# Patient Record
Sex: FEMALE | Race: WHITE | Employment: UNEMPLOYED | RURAL
[De-identification: names, ages, dates, MRNs, and addresses within clinical notes are randomized per-mention and may not be internally consistent; named-entity substitution may affect disease eponyms.]

---

## 2018-11-19 ENCOUNTER — OFFICE VISIT (OUTPATIENT)
Dept: FAMILY MEDICINE CLINIC | Age: 23
End: 2018-11-19

## 2018-11-19 VITALS
WEIGHT: 225 LBS | SYSTOLIC BLOOD PRESSURE: 116 MMHG | BODY MASS INDEX: 37.49 KG/M2 | HEART RATE: 73 BPM | OXYGEN SATURATION: 98 % | RESPIRATION RATE: 16 BRPM | HEIGHT: 65 IN | TEMPERATURE: 97.5 F | DIASTOLIC BLOOD PRESSURE: 81 MMHG

## 2018-11-19 DIAGNOSIS — H66.001 ACUTE SUPPURATIVE OTITIS MEDIA OF RIGHT EAR WITHOUT SPONTANEOUS RUPTURE OF TYMPANIC MEMBRANE, RECURRENCE NOT SPECIFIED: ICD-10-CM

## 2018-11-19 DIAGNOSIS — G51.0 BELL'S PALSY: Primary | ICD-10-CM

## 2018-11-19 PROBLEM — E66.01 SEVERE OBESITY (HCC): Status: ACTIVE | Noted: 2018-11-19

## 2018-11-19 RX ORDER — LEVOFLOXACIN 500 MG/1
500 TABLET, FILM COATED ORAL DAILY
Qty: 10 TAB | Refills: 0 | Status: SHIPPED | OUTPATIENT
Start: 2018-11-19 | End: 2018-11-29

## 2018-11-19 NOTE — PROGRESS NOTES
CC: New patient, Bell's palsy HPI: Pt is a 20 y.o. female who presents for new patient, Bell's palsy. Three weeks ago she noticed some tingling of her R face and then that she couldn't purse her lips around her straw at dinner. She had been fine leading up to that. Symptoms continued to worsen that night and she went to urgent care the next morning. There she was given acyclovir, prednisone and a Z-pack for double ear infection. She had recently been sick with a viral illness as well. Since then her symptoms have not improved at all but do not seem to have worsened. Her right eye does not close all the way and she is not able to chew on the R side. She has been using artificial tears in her eye and covering it with an eye patch at night. She has not noticed any changes in vision other than at the end of the day she seems to have more secretions and vision gets blurry. She does continue to have pain in her ears. She denies any personal or family h/o neurological problems and has generally been very healthy. History reviewed. No pertinent past medical history. Family History Problem Relation Age of Onset  Diabetes Mother  Diabetes Maternal Grandmother  Heart Disease Maternal Grandfather  Diabetes Maternal Grandfather Social History Tobacco Use  Smoking status: Never Smoker  Smokeless tobacco: Never Used Substance Use Topics  Alcohol use: No  
  Frequency: Never  Drug use: Not on file ROS: 
Positive only when bolded Constitutional: HA, F/C Eyes: Itching/draining, changes in vision Ears, nose, mouth, throat, and face: Rhinorrhea, congestion, sore throat, ear pain Respiratory: SOB, wheezing, cough Cardiovascular: CP, palpitations Hematologic/lymphatic: TOSHIA Neurological: Changes in gait, numbness/tingling PE: 
Visit Vitals /81 (BP 1 Location: Right arm, BP Patient Position: Sitting) Pulse 73 Temp 97.5 °F (36.4 °C) (Oral) Resp 16  
 Ht 5' 4.5\" (1.638 m) Wt 225 lb (102.1 kg) LMP 10/27/2018 (Exact Date) SpO2 98% BMI 38.02 kg/m² Gen: Pt sitting in chair, in NAD Head: Normocephalic, atraumatic. +facial droop on R and lack of forehead wrinkling on R Eyes: Sclera anicteric but mildly injected b/l, EOM grossly intact, PERRL. Unable to fully close R eye. Ears: L canal erythematous with sheet of dry wax partially obscuring TM, but appears pearly grey with good reflex. R canal erythematous +effusion. No vesicles. Nose: Normal nasal mucosa Throat: MMM, normal lips, tongue, teeth and gums Neck: Supple, no LAD 
CVS: Normal S1, S2, no m/r/g Resp: CTAB, no wheezes or rales Extrem: Atraumatic, no cyanosis or edema Pulses: 2+ Skin: Warm, dry Neuro: Alert, oriented, appropriate. Marked R facial droop. Decreased sensation to light touch over entire R half of face. A/P: Pt is a 20 y.o. female who presents for new patient, Bell's Palsy of assumed viral etiology, however she did have a b/l otitis media on presentation and continues to have evidence of R otitis media today despite treatment with azithromycin (pt is PCN allergic/anaphylaxis) so inflammation of facial nerve from this is also possible. No vesicular lesions to suggest Anjel Hunt syndrome. Rapid onset and forehead involvement are reassuring, but given lack of any improvement in symptoms and significant interference in her daily activities, will refer to Neuro for further work-up. - Will treat otitis media with levaquin given lack of response to azithromycin and pt with anaphylactic reaction to PCN, do not want to attempt cephalosporin - If ear pain symptoms not improved, will need to RTC and likely referral to ENT 
- RTC prn pending results of above interventions Discussed diagnoses in detail with patient. Medication risks/benefits/side effects discussed with patient. All of the patient's questions were addressed.  The patient understands and agrees with our plan of care. The patient knows to call back if they are unsure of or forget any changes we discussed today or if the symptoms change. The patient received an After-Visit Summary which contains VS, orders, medication list and allergy list. This can be used as a \"mini-medical record\" should they have to seek medical care while out of town. No current outpatient medications on file prior to visit. No current facility-administered medications on file prior to visit.

## 2018-11-19 NOTE — PROGRESS NOTES
Reviewed record in preparation for visit and have obtained necessary documentation. Patient did not bring medications to visit for review. Information provided on Advanced Directive, Living Will. Body mass index is 38.02 kg/m². Health Maintenance Due Topic Date Due  
 HPV Age 9Y-34Y (1 - Female 3-dose series) 05/11/2004  DTaP/Tdap/Td series (1 - Tdap) 05/11/2014  PAP AKA CERVICAL CYTOLOGY  05/11/2014  Influenza Age 5 to Adult  08/01/2018

## 2018-12-21 ENCOUNTER — OFFICE VISIT (OUTPATIENT)
Dept: NEUROLOGY | Age: 23
End: 2018-12-21

## 2018-12-21 VITALS
SYSTOLIC BLOOD PRESSURE: 118 MMHG | HEIGHT: 65 IN | DIASTOLIC BLOOD PRESSURE: 68 MMHG | RESPIRATION RATE: 20 BRPM | BODY MASS INDEX: 37.32 KG/M2 | WEIGHT: 224 LBS

## 2018-12-21 DIAGNOSIS — G51.0 RIGHT-SIDED BELL'S PALSY: ICD-10-CM

## 2018-12-21 DIAGNOSIS — H93.A9 PULSATILE TINNITUS: ICD-10-CM

## 2018-12-21 DIAGNOSIS — G51.0 RIGHT-SIDED BELL'S PALSY: Primary | ICD-10-CM

## 2018-12-21 NOTE — PROGRESS NOTES
[id number]   [place] [address]   [facility]    [facility]   [phone] Fax             Referring: Nicole Perdue MD      Chief Complaint   Patient presents with    Facial Pain     61-year-old right-handed young woman who comes today for evaluation of what she calls Bell's palsy not getting better. She says about 25 October, the Wednesday before Halloween, she had the onset of right sided facial droop. She and her  were about to go out to dinner and then she realized that her face was drooping. She was unable to close the eye. She is unable to keep beverages in her mouth. She was seen and evaluated by primary care and she says that she had a cold that seemed to come on around the same time she then went into an upper respiratory type infection and had double ear infection she was told. She has been given 2 rounds of antibiotics. She has had absolutely no improvement at all since the onset of this. No other inciting factor. No injury. She has never had this before. She is never had any focal neurologic deficit that persisted and then spontaneously resolved. No family history of such. No exposures. She says that she is unable to close her right eye. She is unable to use a straw. Water will come out of her mouth when she drinks. She is keeping the eye taped down at night to prevent injury. She is also using artificial tears to ensure that the eye stays moist.  She has not had any weakness in the arms or legs. No numbness or tingling in the legs. Says the right side of the face feels a bit numb but she can feel it if she touches it. She has not had a recent fever chills nausea or vomiting. No chest pain or palpitations. No loss or alteration in consciousness. History reviewed. No pertinent past medical history.     Past Surgical History:   Procedure Laterality Date    HX HEENT         Current Outpatient Medications   Medication Sig Dispense Refill    ALBUTEROL IN Take  by inhalation. Allergies   Allergen Reactions    Amoxicillin Anaphylaxis    Pcn [Penicillins] Anaphylaxis    Sulfur Anaphylaxis       Social History     Tobacco Use    Smoking status: Never Smoker    Smokeless tobacco: Never Used   Substance Use Topics    Alcohol use: No     Frequency: Never    Drug use: Not on file       Family History   Problem Relation Age of Onset    Diabetes Mother     Diabetes Maternal Grandmother     Heart Disease Maternal Grandfather     Diabetes Maternal Grandfather        Review of Systems  Pertinent positives and negatives as noted with remainder of comprehensive review negative    Examination  Visit Vitals  /68   Resp 20   Ht 5' 4.5\" (1.638 m)   Wt 101.6 kg (224 lb)   BMI 37.86 kg/m²     She is a friendly lady. Her dress and grooming are appropriate. She has no scleral icterus. She has no carotid bruit. Heart is regular. Pulses symmetrical.  No edema of the lower extremities. Neurologically she is awake alert oriented and conversant. She has normal speech-language cognition and attention. She has a bit of a sad affect and she gets tearful describing how her facial droop is not gotten better. Cranial nerves are intact 2-12 with the exception of a right peripheral seventh and she is unable to raise the brow. She is unable to close the eye completely. Bell's phenomena is seen. She does also endorse some decreased sensation about the right side of the face versus the left. No pronation drift or abnormal movement. No abnormality of bulk or tone. Strength is full in the upper and lower extremities in all muscle groups to direct testing. Reflexes symmetrical throughout no pathologic reflexes. No ataxia. Sensory intact primary.   Her gait is steady    Impression/Plan  78-year-old woman with right sided Bell's palsy ongoing now for 2 months without any improvement at all which certainly is a bit ominous and worrisome for an intracranial anatomic abnormality or other etiology of such. Given that she is not had any improvement at all over the last 2 months we will proceed with an MRI of the brain evaluating the right facial nerve but then also looking for demyelinating plaques, evidence of potential neurosarcoid versus other. Given her pulsatile tinnitus one also needs to worry about AV etc.  We will start with a carotid Doppler but depending on the results of the MRI we may wish to get a CTA looking for a vascular abnormality that could be etiology of the Bell's as well as of the pulsatile tinnitus. No medications given today. Multiple lab tests today including sedimentation rate, MUSA, ACE level, B12 and thyroid function sent. Follow-up after her testing. Dora Ramirez MD    This note was created using voice recognition software. Despite editing, there may be syntax errors. This note will not be viewable in [address]

## 2018-12-27 NOTE — PROCEDURES
Carotid Doppler:     Date:  12/21/2018    Requesting Physician:  Lucía Villalpando MD     Indication:  Pulsatile tinnitus. B-mode imaging reveals no significant plaque throughout the carotid systems bilaterally. Doppler spectral analysis reveals no significant velocity shifts. Vertebral artery flow is antegrade bilaterally. Interpretation:  Normal study.

## 2019-01-02 LAB
ACE SERPL-CCNC: 44 U/L (ref 14–82)
ANA SER QL: POSITIVE
CENTROMERE B AB SER-ACNC: <0.2 AI (ref 0–0.9)
CHROMATIN AB SERPL-ACNC: 0.2 AI (ref 0–0.9)
DSDNA AB SER-ACNC: 143 IU/ML (ref 0–9)
ENA JO1 AB SER-ACNC: <0.2 AI (ref 0–0.9)
ENA RNP AB SER-ACNC: <0.2 AI (ref 0–0.9)
ENA SCL70 AB SER-ACNC: <0.2 AI (ref 0–0.9)
ENA SM AB SER-ACNC: <0.2 AI (ref 0–0.9)
ENA SS-A AB SER-ACNC: <0.2 AI (ref 0–0.9)
ENA SS-B AB SER-ACNC: <0.2 AI (ref 0–0.9)
ERYTHROCYTE [SEDIMENTATION RATE] IN BLOOD BY WESTERGREN METHOD: 12 MM/HR (ref 0–32)
RPR SER QL: NON REACTIVE
SEE BELOW, 164869: ABNORMAL
T4 FREE SERPL-MCNC: 1.19 NG/DL (ref 0.82–1.77)
TSH SERPL DL<=0.005 MIU/L-ACNC: 2.06 UIU/ML (ref 0.45–4.5)
VIT B12 SERPL-MCNC: 414 PG/ML (ref 232–1245)

## 2019-01-03 ENCOUNTER — HOSPITAL ENCOUNTER (OUTPATIENT)
Dept: MRI IMAGING | Age: 24
Discharge: HOME | End: 2019-01-03
Attending: PSYCHIATRY & NEUROLOGY
Payer: COMMERCIAL

## 2019-01-03 DIAGNOSIS — G51.0 RIGHT-SIDED BELL'S PALSY: ICD-10-CM

## 2019-01-03 DIAGNOSIS — H93.A9 PULSATILE TINNITUS: ICD-10-CM

## 2019-01-03 PROCEDURE — 70553 MRI BRAIN STEM W/O & W/DYE: CPT

## 2019-01-03 PROCEDURE — 74011250636 HC RX REV CODE- 250/636: Performed by: PSYCHIATRY & NEUROLOGY

## 2019-01-03 PROCEDURE — A9575 INJ GADOTERATE MEGLUMI 0.1ML: HCPCS | Performed by: PSYCHIATRY & NEUROLOGY

## 2019-01-03 RX ORDER — GADOTERATE MEGLUMINE 376.9 MG/ML
20 INJECTION INTRAVENOUS
Status: COMPLETED | OUTPATIENT
Start: 2019-01-03 | End: 2019-01-03

## 2019-01-03 RX ADMIN — GADOTERATE MEGLUMINE 20 ML: 376.9 INJECTION INTRAVENOUS at 14:34

## 2019-01-22 ENCOUNTER — OFFICE VISIT (OUTPATIENT)
Dept: NEUROLOGY | Age: 24
End: 2019-01-22

## 2019-01-22 VITALS
OXYGEN SATURATION: 98 % | DIASTOLIC BLOOD PRESSURE: 60 MMHG | BODY MASS INDEX: 37.32 KG/M2 | WEIGHT: 224 LBS | HEIGHT: 65 IN | RESPIRATION RATE: 16 BRPM | HEART RATE: 77 BPM | SYSTOLIC BLOOD PRESSURE: 105 MMHG

## 2019-01-22 DIAGNOSIS — G51.0 BELL'S PALSY: Primary | ICD-10-CM

## 2019-01-22 DIAGNOSIS — R76.8 ELEVATED ANTINUCLEAR ANTIBODY (ANA) LEVEL: ICD-10-CM

## 2019-01-22 RX ORDER — PREDNISONE 10 MG/1
TABLET ORAL
Qty: 42 TAB | Refills: 0 | Status: SHIPPED | OUTPATIENT
Start: 2019-01-22 | End: 2019-03-19 | Stop reason: ALTCHOICE

## 2019-01-22 RX ORDER — GABAPENTIN 300 MG/1
300 CAPSULE ORAL 3 TIMES DAILY
Qty: 90 CAP | Refills: 5 | Status: SHIPPED | OUTPATIENT
Start: 2019-01-22 | End: 2019-03-19

## 2019-01-22 NOTE — PROGRESS NOTES
Ottoniel Boone is a 20 y.o. female who presents with the following  Chief Complaint   Patient presents with    Follow-up     MRI, [facility], Lab       HPI Patient comes in for a follow up for MRI brain, doppler, labs. She states the symptoms are the same. Still having daily facial pain, right sided numbness, tingling and droop. He has not noticed really any positive changes. She has not seen any improvement since things started in October. Has not tried steroids, gabapentin. No other concerns. Allergies   Allergen Reactions    Amoxicillin Anaphylaxis    Pcn [Penicillins] Anaphylaxis    Sulfur Anaphylaxis       Current Outpatient Medications   Medication Sig    predniSONE (DELTASONE) 10 mg tablet 6 po x2 days, 5 po x 2days, 4 po x 2days, 3 po x2days, 2 po x2days, 1 po x 2days    gabapentin (NEURONTIN) 300 mg capsule Take 1 Cap by mouth three (3) times daily.  ALBUTEROL IN Take  by inhalation. No current facility-administered medications for this visit. Social History     Tobacco Use   Smoking Status Never Smoker   Smokeless Tobacco Never Used       History reviewed. No pertinent past medical history. Past Surgical History:   Procedure Laterality Date    HX HEENT         Family History   Problem Relation Age of Onset    Diabetes Mother     Diabetes Maternal Grandmother     Heart Disease Maternal Grandfather     Diabetes Maternal Grandfather        Social History     Socioeconomic History    Marital status:      Spouse name: Not on file    Number of children: Not on file    Years of education: Not on file    Highest education level: Not on file   Tobacco Use    Smoking status: Never Smoker    Smokeless tobacco: Never Used   Substance and Sexual Activity    Alcohol use: No     Frequency: Never       Review of Systems   Eyes: Negative for blurred vision and double vision. Gastrointestinal: Negative for nausea and vomiting.    Neurological: Positive for tingling and sensory change. Negative for dizziness, weakness and headaches. Remainder of comprehensive review is negative. Physical Exam :    Visit Vitals  /60 (BP 1 Location: Right arm, BP Patient Position: Sitting)   Pulse 77   Resp 16   Ht 5' 4.5\" (1.638 m)   Wt 101.6 kg (224 lb)   SpO2 98%   BMI 37.86 kg/m²             Results for orders placed or performed in visit on 18   MUSA, DIRECT, W/REFLEX   Result Value Ref Range    Antinuclear Antibodies Direct Positive (A) Negative    Anti-DNA (DS) Ab,  (H) 0 - 9 IU/mL    RNP Abs <0.2 0.0 - 0.9 AI    Aiken Abs <0.2 0.0 - 0.9 AI    Scleroderma-70 Ab <0.2 0.0 - 0.9 AI    Sjogren's Anti-SS-A <0.2 0.0 - 0.9 AI    Sjogren's Anti-SS-B <0.2 0.0 - 0.9 AI    Antichromatin Ab 0.2 0.0 - 0.9 AI    Anti-Ashlee-1 <0.2 0.0 - 0.9 AI    Centromere B Ab <0.2 0.0 - 0.9 AI    See below Comment    RPR   Result Value Ref Range    RPR Non Reactive Non Reactive   ANGIOTENSIN CONVERTING ENZYME   Result Value Ref Range    Angiotensin Converting Enzyme (ACE) 44 14 - 82 U/L   TSH 3RD GENERATION   Result Value Ref Range    TSH 2.060 0.450 - 4.500 uIU/mL   T4, FREE   Result Value Ref Range    T4, Free 1.19 0.82 - 1.77 ng/dL   VITAMIN B12   Result Value Ref Range    Vitamin B12 414 232 - 1,245 pg/mL   SED RATE (ESR)   Result Value Ref Range    Sed rate (ESR) 12 0 - 32 mm/hr       Orders Placed This Encounter    REFERRAL TO RHEUMATOLOGY     Referral Priority:   Routine     Referral Type:   Consultation     Referral Reason:   Specialty Services Required     Referred to Provider:   Joel Bang MD     Number of Visits Requested:   1    predniSONE (DELTASONE) 10 mg tablet     Si po x2 days, 5 po x 2days, 4 po x 2days, 3 po x2days, 2 po x2days, 1 po x 2days     Dispense:  42 Tab     Refill:  0    gabapentin (NEURONTIN) 300 mg capsule     Sig: Take 1 Cap by mouth three (3) times daily. Dispense:  90 Cap     Refill:  5       1. Bell's palsy    2.  Elevated antinuclear antibody (MUSA) level Follow-up Disposition:  Return in about 2 months (around 3/22/2019), or [place]. MRI as discussed further. Doppler and labs. She has a positive MUSA 143. She has no known family history of any rheumatological issues. She will get a referral to Rheumatology to evaluate this further. We discussed treatment of her symptoms. Try Gabapentin 300 mg TID and Prednisone taper to help with pain, facial symptoms. We discussed PT. She will look online for facial exercises as she has trouble getting places due to her two children as she is primary caregiver with no real other family around. We discussed following back in [place] as she lives there. Call with any concerns.          This note will not be viewable in [employer]

## 2019-02-01 ENCOUNTER — TELEPHONE (OUTPATIENT)
Dept: FAMILY MEDICINE CLINIC | Age: 24
End: 2019-02-01

## 2019-02-01 NOTE — TELEPHONE ENCOUNTER
Spoke with patient and advised her that I will try to get her an earlier appointment and will call her back.  (Neuro referred her to rheumatology)

## 2019-02-01 NOTE — TELEPHONE ENCOUNTER
Please call Pt. She has an appointment with the RA Specialist, Carolin Mobley, the appointment is 04/17/19. Pt is moving back to [place] in June. She needs an earlier appointment with them so she can get seen and whatever they need done before she leaves so she would not have to start here and then find another doctor in [place]. Please call Pt [phone].

## 2019-02-06 NOTE — TELEPHONE ENCOUNTER
Called patient and informed her that an earlier appointment was made for her. Appointment is 3/19/19 at 9:30.

## 2019-03-19 ENCOUNTER — OFFICE VISIT (OUTPATIENT)
Dept: RHEUMATOLOGY | Age: 24
End: 2019-03-19

## 2019-03-19 VITALS
SYSTOLIC BLOOD PRESSURE: 104 MMHG | DIASTOLIC BLOOD PRESSURE: 74 MMHG | HEART RATE: 80 BPM | BODY MASS INDEX: 35.65 KG/M2 | TEMPERATURE: 98.6 F | WEIGHT: 214 LBS | HEIGHT: 65 IN | OXYGEN SATURATION: 97 % | RESPIRATION RATE: 18 BRPM

## 2019-03-19 DIAGNOSIS — G51.0 BELL'S PALSY: Primary | ICD-10-CM

## 2019-03-19 DIAGNOSIS — R76.8 POSITIVE ANA (ANTINUCLEAR ANTIBODY): ICD-10-CM

## 2019-03-19 NOTE — PROGRESS NOTES
Chief Complaint   Patient presents with    Other     Positive MUSA     1. Have you been to the ER, urgent care clinic since your last visit? Hospitalized since your last visit? No    2. Have you seen or consulted any other health care providers outside of the [address] [place] [address] since your last visit? Include any pap smears or colon screening.  No

## 2019-03-19 NOTE — PROGRESS NOTES
REASON FOR VISIT    This is the initial evaluation for Ms. Chencho Pierre a 20 y.o.  female for question of positive MUSA. The patient is referred to the [facility] at the request of Dr. Corrina Gorman. HISTORY OF PRESENT ILLNESS     This is a 20 y.o. female with hx of bells palsy of unclear etiology. MHAQ: 0.5  Pain scale: 0  The patient notes that she developed [place] palsy the Wednesday before Halloween. She went to neurologist who did a CT head and it was normal but in the process of doing that and as part of the blood work, MUSA was positive. The patient is seeing the neurologist still. The patient notes that she had went to go see her  that Wednesday and she was in the middle of eating and all of a sudden her face stopped working. She was dripping food. She was coherent so she went about her day and went to urgent care the next morning. She was told it was bells palsy and went to see her PCP 2-5 days after the bells palsy started. The PCP gave her a prescription for a steroid (possibly decadron) and it didn't work at all. She was referred to neurologist and saw him in early 12/2018 who gave her prednisone which worked immediately. It was a 10 day taper. The symptoms were slowly resolving but they didn't go away completely. She still can't move the right eyebrow, can't flare her right nostril. She can't hold her face up for a long time on the right side. She can close her right eye now. She has mild numbness on right side and she has ringing in the ears as well. She has been doing the facial exercises herself and she thinks that's helped slightly. She was taking gabapentin but she stopped it due to depression and moodiness. She has had mild folliculitis all her life and when she is outside they get more prominent. Before she got the bells palsy, she had test positive for strep throat (6/2018) and she finished the antibiotic course for it.   She does not spend too much time outside. She does play outside with her kids. No tick bites that she can remember. No rashes last summer. No hearing loss but she has ringing in her ears. She has a cough currently due to an infection. No lightheadedness, headaches, vision changes. No dry eyes or mouth. She gets nauseous often. She tends to get UTIs very frequently. No joint pain or swelling. She has knee stiffness b/l in the morning. No pregnancy losses, no hx of blood clots. NO hx of  STIs. No rashes when the bells palsy happened. REVIEW OF SYSTEMS    A 15 point review of systems was performed and summarized below. The questionnaire was reviewed with the patient and scanned into the patient's medical record.     General:  denies recent weight gain, recent weight loss, fatigue, weakness, fever, night sweats  Musculoskeletal:  denies joint pain, joint swelling, morning stiffness , muscle pain  Ears: endorses ringing in ears denies , loss of hearing, deafness  Eyes: denies pain, redness, loss of vision, double vision, blurred vision, dryness, foreign body sensation  Mouth: denies sore tongue, oral ulcers, bleeding gums, loss of taste, dryness, increased dental caries  Nose:  denies nosebleeds, loss of smell, nasal ulcers  Throat:denies frequent sore throats, hoarseness, difficulty in swallowing, pain in jaw while chewing  Neck:  denies swollen glands, tender glands  Cardiopulmonary:  denies pain in chest, irregular heart beat, sudden changes in heart beat, shortness of breath, difficulty breathing at night, dry cough, productive cough, coughing of blood, wheezing  Gastrointestinal:  denies nausea, heartburn, stomach pain relieved by food, vomiting of blood/\"coffee grounds\", jaundice, increasing constipation, persistent diarrhea, blood in stools, black stools  Genitourinary:  denies nocturia, difficult urination, pain or burning on urination, blood in urine, cloudy urine, pus in urine, genital discharge, frequent urination, vaginal dryness, rash/ulcers, sexual difficulties  Hematologic:  denies anemia, bleeding tendency, blood clots  Skin:  denies easy bruising, sun sensitive, rash, redness, hives, skin tightness, nodules/bumps, hair loss, color changes of hands or feet in the cold (Raynaud's), nailbed changes, mechanics hands  Neurologic:  denies headaches, dizziness, muscle weakness, numbness or tingling in hands/feet, memory loss  Psychiatric:  denies depression, excessive worries, PTSD, Bipolar  Sleep: endorses difficulty falling asleep, difficulty staying asleep  denies poor sleep (8 hours), denies snoring, apnea, daytime somnolence,   PAST MEDICAL HISTORY    History reviewed. No pertinent past medical history. Past Surgical History:   Procedure Laterality Date    HX HEENT         FAMILY HISTORY    Family History   Problem Relation Age of Onset    Diabetes Mother     Glaucoma Mother     Diabetes Maternal Grandmother     Heart Disease Maternal Grandfather     Diabetes Maternal Grandfather     Diabetes Maternal Aunt     Diabetes Maternal Uncle        SOCIAL HISTORY    Social History     Tobacco Use    Smoking status: Never Smoker    Smokeless tobacco: Never Used   Substance Use Topics    Alcohol use: Yes     Frequency: Monthly or less    Drug use: No       MEDICATIONS    Current Outpatient Medications   Medication Sig Dispense Refill    ALBUTEROL IN Take  by inhalation. ALLERGIES    Allergies   Allergen Reactions    Amoxicillin Anaphylaxis    Pcn [Penicillins] Anaphylaxis    Sulfur Anaphylaxis       PHYSICAL EXAMINATION    Visit Vitals  /74 (BP 1 Location: Left arm, BP Patient Position: Sitting)   Pulse 80   Temp 98.6 °F (37 °C) (Oral)   Resp 18   Ht 5' 4.5\" (1.638 m)   Wt 214 lb (97.1 kg)   SpO2 97%   BMI 36.17 kg/m²     Body mass index is 36.17 kg/m². General: NAD  HEENT: PERRL, anicteric, non-injected sclerae; oropharynx without ulcers, erythema, or exudate.   Moist mucous membranes. Lymphatic: No cervical or axillary lymphadenopathy. Cardiovascular: S1, S2,no R/M/G  Pulmonary: CTA b/l. No wheezes/rales/rhonchi. Abdominal: Soft,NTND, + BS. Skin:  Cherry hemangiomas and folliculitis on arms b/l  Neuro: numbness on the right side of the face. Unable to lift the right eyebrow. Facial droop on the right side. Unable to puff out her cheek on the right side. Normal strength and sensation everywhere else    Musculoskeletal:   Cervical & Lumbar Spine  Neck and spine have no noted deformities or signs of inflammation. Curvature of cervical, thoracic, and lumbar spine are within normal limits. Wrist, Hand, & Fingers  No bony deformities, inflammation, or tenderness of bony prominences. No anatomical snuff box tenderness; Full ROM in DIP, PIP, MCP, & carpal joints & with supination and pronation. Elbow  No bony deformities, inflammation, or tenderness in olecranon, medial, lateral epicondyle elbow. Full ROM upon flexion and extension. Shoulder  No bony deformities, inflammation, or tenderness in rotator cuff, biceps tendon, or acromioclavicular joint. Full ROM and strength in shoulder upon adduction, abduction, internal and external rotation. Hip  No bony deformities, inflammation, or tenderness in hip joint. Knee  FROM of the knee. NO swelling or warmth noted. No bony deformity noted    Ankle/toes  No bony deformities, inflammation or tenderness      DATA REVIEW    Prior medical records were reviewed and if applicable are summarized as below:    Labs:   12/2018: MUSA direct positive,+ dsDNA, - rnp/smith, SCL 70, SSA/B, SOL-1, anti-centromere antibody,  RPR, ACE, TSH, vitamin B12, ESR normal    Imaging:   MRI brain (12/2018): 1. Increased enhancement of the right facial nerve lateral to the internal  auditory canal, consistent with the diagnosis of Bell's palsy. No mass lesions. 2. No intracranial abnormalities. Bilateral carotid ultrasound (12/2018):  Normal study.     ASSESSMENT AND PLAN    A 20 y.o. female  With no PMHx with right sided bells palsy presents for evaluation of positive MUSA. The etiology of her bells palsy remains unclear and includes infections such as lyme, HIV, VZV, HSV, sarcoidosis, sjogrens syndrome, idiopathic. She has a positive MUSA but not other features of her autoimmune disease. It is a direct positive MUSA and has a high rate of false positive therefore I will repeat the test.     # Positive MUSA: The patient has a positive MUSA. A positive MUSA can be seen in autoimmune diseases such as SLE, Sjögren's syndrome, inflammatory muscle disease, mixed connective tissue disease, drug-induced lupus, juvenile arthritis, and autoimmune hepatitis. A history of thyroid disease in the family or thyroid disease in the patient can also cause a positive MUSA. Viral infections, malignancy and medications can also cause a positive MUSA. Up to 15% of the healthy population can have a positive MUSA and only 13% of those with a positive MUSA will have SLE. The fluorescent MUSA test is more sensitive/specific than the direct MUSA. As the titer increases the chances of the patient having an autoimmune cause increases. - Repeat MUSA with titer  - Obtain other autoantibodies for autoimmune disease (sjogren's MCTD, SLE)    # [place] palsy: as noted above, unclear etiology  - will do HIV, Lyme testing, ANCA  - CXR for sarcoidosis  - defer management to neurology. The patient voiced understanding of the aforementioned assessment and plan. Summary of plan was provided in the After Visit Summary patient instructions. I also provided education about MyChart setup and utility. Ms. Capri Bingham has a reminder for a \"due or due soon\" health maintenance. I have asked that she contact her primary care provider for follow-up on this health maintenance.     TODAY'S ORDERS    Orders Placed This Encounter    XR CHEST PA LAT    ANTINUCLEAR ANTIBODIES, IFA    COMPLEMENT, C3 & C4    DSDNA (NDNA) SCRN BY CRITHIDIA    RHEUMATOID FACTOR, QL    RNP ANTIBODIES    SCLERODERMA (SCL-70) AB, IGG    SMITH ANTIBODIES    SJOGREN'S ABS, SSA AND SSB    URINALYSIS W/ RFLX MICROSCOPIC    HIV 1/2 AG/AB, 4TH GENERATION,W RFLX CONFIRM    LYME AB, IGM, WITH REFLEX WBLOT    ANTI-NEUTROPHIL CYTOPLASMIC AB       Future Appointments   Date Time Provider Dalila Correa   4/9/2019 12:30 PM Aruna Vickers MD [facility] JESS ANAHY   4/10/2019  4:00 PM Gabby House  Sumaya Feldman MD    Adult Rheumatology   [facility]  A Part of [facility], [address] [place] [address] Road   Phone [phone]  Fax [phone]

## 2019-03-22 LAB
ANA TITR SER IF: NEGATIVE {TITER}
APPEARANCE UR: CLEAR
B BURGDOR IGM SER IA-ACNC: <0.8 INDEX (ref 0–0.79)
BACTERIA #/AREA URNS HPF: NORMAL /[HPF]
BILIRUB UR QL STRIP: NEGATIVE
C-ANCA TITR SER IF: NORMAL TITER
C3 SERPL-MCNC: 145 MG/DL (ref 82–167)
C4 SERPL-MCNC: 31 MG/DL (ref 14–44)
CASTS URNS QL MICRO: NORMAL /LPF
COLOR UR: YELLOW
DSDNA (NDNA) SCRN BY CRITHIDIA: NORMAL TITER
ENA RNP AB SER-ACNC: <0.2 AI (ref 0–0.9)
ENA SCL70 AB SER-ACNC: <0.2 AI (ref 0–0.9)
ENA SM AB SER-ACNC: <0.2 AI (ref 0–0.9)
ENA SS-A AB SER-ACNC: <0.2 AI (ref 0–0.9)
ENA SS-B AB SER-ACNC: <0.2 AI (ref 0–0.9)
EPI CELLS #/AREA URNS HPF: NORMAL /HPF (ref 0–10)
GLUCOSE UR QL: NEGATIVE
HGB UR QL STRIP: ABNORMAL
HIV 1+2 AB+HIV1 P24 AG SERPL QL IA: NON REACTIVE
KETONES UR QL STRIP: ABNORMAL
LEUKOCYTE ESTERASE UR QL STRIP: NEGATIVE
MICRO URNS: ABNORMAL
MUCOUS THREADS URNS QL MICRO: PRESENT
NITRITE UR QL STRIP: POSITIVE
P-ANCA ATYPICAL TITR SER IF: NORMAL TITER
P-ANCA TITR SER IF: NORMAL TITER
PH UR STRIP: 5.5 [PH] (ref 5–7.5)
PROT UR QL STRIP: NEGATIVE
RBC #/AREA URNS HPF: NORMAL /HPF (ref 0–2)
RHEUMATOID FACT SERPL-ACNC: <10 IU/ML (ref 0–13.9)
SP GR UR: 1.03 (ref 1–1.03)
UROBILINOGEN UR STRIP-MCNC: 0.2 MG/DL (ref 0.2–1)
WBC #/AREA URNS HPF: NORMAL /HPF (ref 0–5)

## 2019-04-09 ENCOUNTER — OFFICE VISIT (OUTPATIENT)
Dept: NEUROLOGY | Age: 24
End: 2019-04-09

## 2019-04-09 VITALS
WEIGHT: 212.2 LBS | HEART RATE: 66 BPM | SYSTOLIC BLOOD PRESSURE: 121 MMHG | BODY MASS INDEX: 36.23 KG/M2 | RESPIRATION RATE: 20 BRPM | DIASTOLIC BLOOD PRESSURE: 67 MMHG | HEIGHT: 64 IN | OXYGEN SATURATION: 98 %

## 2019-04-09 DIAGNOSIS — G51.0 RIGHT-SIDED BELL'S PALSY: Primary | ICD-10-CM

## 2019-04-09 RX ORDER — PREDNISONE 10 MG/1
TABLET ORAL
Qty: 21 TAB | Refills: 0 | Status: SHIPPED | OUTPATIENT
Start: 2019-04-09

## 2019-04-09 NOTE — PROGRESS NOTES
Patient is here for a follow up for bells palsy. She stopped taking the gabapentin because she states that is severely affected her mood. She is still getting the neuropathy in her face but the pain comes and goes. Would like to talk about medication changes. No other concerns at this time.

## 2019-04-09 NOTE — PROGRESS NOTES
Interval HPI:   This is a 20 y.o. female who is following up for     Chief Complaint   Patient presents with    Facial Droop     Follow Up      Follow up for Right sided Bell's Palsy (late October 2018). Reviewed EMR notes from Dr Diane Singletary () and DWAYNE Chacon (1-22-19). MRI Brain confirmed right sided bell's palsy (increased enhancement of right facial nerve lateral to IAC consistent with Bell'sPalsy). MUSA returned + so was referred to Rheumatology and has seen them recently and undergone additional lab work. Pt says right facial droop has improved since last visit in January. Not having ringing in ears any more. Occasionally has pain behind right ear or occasional pressure on right side of face that can hurt at times. Still can't puff cheeks on right. Can close the right eye. She asks about repeating the steroid pack as she noted significant improvement when she used it the first time. Discussed with patient that while steroids very early in the Dx of Bell's Palsy have been show to improve outcome, steroids later in the course haven't. However, she has no contraindications (ie. Diabetes) to a short course of steroid so I am open to a 6 day course of prednisone. Brief ROS: as above or otherwise negative        Allergies   Allergen Reactions    Amoxicillin Anaphylaxis    Pcn [Penicillins] Anaphylaxis    Sulfur Anaphylaxis     Current Outpatient Medications   Medication Sig Dispense Refill    predniSONE (DELTASONE) 10 mg tablet Take 6 tabs on Day 1, then reduce by 1 tablet a day over the next 5 days till done. Take in AM with food. 21 Tab 0    ALBUTEROL IN Take  by inhalation. Physical Exam  Blood pressure 121/67, pulse 66, resp. rate 20, height 5' 4\" (1.626 m), weight 212 lb 3.2 oz (96.3 kg), last menstrual period 03/14/2019, SpO2 98 %.     No acute distress  Neck: no stiffness  Skin: no rashes    Focused Neurological Exam     Mental status: Alert and oriented to person, place situation. Language: normal fluency and comprehension; no dysarthria. CNs:   Visual fields grossly normal  Extraocular movements intact, no nystagmus  Face; mild flattening of right lower face at rest.  Mild-moderate weakness of right eyebrow raise, mild weakness of right lower face. Hearing is intact to casual conversation. Sensory: intact light touch in all 4 extremities  Motor: Normal bulk and strength in all 4 extremities. Reflexes: DTRs are symmetric,1+   Gait: normal    Impression      ICD-10-CM ICD-9-CM    1. Right-sided Bell's palsy G51.0 351.0 predniSONE (DELTASONE) 10 mg tablet       Significant improvement in right facial droop compared to onset per patient. Will give one more round (6 day course) of prednisone to see if any additional benefit. Otherwise, encouraged patient to continue doing facial exercises at home. No additional neurology workup indicated.  Pt will follow up prn

## 2022-03-18 PROBLEM — E66.01 SEVERE OBESITY (HCC): Status: ACTIVE | Noted: 2018-11-19

## 2022-03-19 PROBLEM — G51.0 BELL'S PALSY: Status: ACTIVE | Noted: 2019-03-19

## 2022-03-20 PROBLEM — R76.8 POSITIVE ANA (ANTINUCLEAR ANTIBODY): Status: ACTIVE | Noted: 2019-03-19

## 2023-05-17 RX ORDER — PREDNISONE 10 MG/1
TABLET ORAL
COMMUNITY
Start: 2019-04-09